# Patient Record
Sex: MALE | Race: WHITE | Employment: FULL TIME | URBAN - METROPOLITAN AREA
[De-identification: names, ages, dates, MRNs, and addresses within clinical notes are randomized per-mention and may not be internally consistent; named-entity substitution may affect disease eponyms.]

---

## 2022-10-10 ENCOUNTER — OFFICE VISIT (OUTPATIENT)
Dept: ORTHOPEDIC SURGERY | Age: 57
End: 2022-10-10
Payer: COMMERCIAL

## 2022-10-10 VITALS — BODY MASS INDEX: 32.33 KG/M2 | WEIGHT: 260 LBS | HEIGHT: 75 IN

## 2022-10-10 DIAGNOSIS — M25.561 ACUTE PAIN OF RIGHT KNEE: Primary | ICD-10-CM

## 2022-10-10 PROCEDURE — 20610 DRAIN/INJ JOINT/BURSA W/O US: CPT | Performed by: ORTHOPAEDIC SURGERY

## 2022-10-10 PROCEDURE — 99203 OFFICE O/P NEW LOW 30 MIN: CPT | Performed by: ORTHOPAEDIC SURGERY

## 2022-10-10 RX ORDER — SILDENAFIL 100 MG/1
100 TABLET, FILM COATED ORAL DAILY PRN
COMMUNITY
Start: 2022-04-28

## 2022-10-10 RX ORDER — ATORVASTATIN CALCIUM 10 MG/1
10 TABLET, FILM COATED ORAL DAILY
COMMUNITY
Start: 2022-04-28

## 2022-10-10 RX ORDER — TRIAMCINOLONE ACETONIDE 40 MG/ML
80 INJECTION, SUSPENSION INTRA-ARTICULAR; INTRAMUSCULAR ONCE
Status: COMPLETED | OUTPATIENT
Start: 2022-10-10 | End: 2022-10-10

## 2022-10-10 RX ORDER — LIDOCAINE HYDROCHLORIDE 10 MG/ML
3 INJECTION, SOLUTION INFILTRATION; PERINEURAL ONCE
Status: COMPLETED | OUTPATIENT
Start: 2022-10-10 | End: 2022-10-10

## 2022-10-10 RX ADMIN — TRIAMCINOLONE ACETONIDE 80 MG: 40 INJECTION, SUSPENSION INTRA-ARTICULAR; INTRAMUSCULAR at 09:22

## 2022-10-10 RX ADMIN — LIDOCAINE HYDROCHLORIDE 3 ML: 10 INJECTION, SOLUTION INFILTRATION; PERINEURAL at 09:21

## 2022-10-10 NOTE — PROGRESS NOTES
Suzy Starkey is seen today for evaluation of his right knee. He is status post ACL reconstruction with patella tendon performed in 1993. He has done well from that but over the last 5 or 6 months has noticed increasing right knee discomfort. Hurts in the medial knee. He has pain getting up from a seated position, after driving, and at night. Pain is 6 or 7 out of 10. He has not had formal treatment for the right knee since his surgery. Left knee status post patella tendon repair in 2009. He has high cholesterol but is otherwise healthy. He runs an audiovisual company. History: Patient's relevant past family, medical, and social history are reviewed as part of today's visit. ROS of pertinent positives and negatives as above; otherwise negative. General Exam:    Vitals: Height 6' 3\" (1.905 m), weight 260 lb (117.9 kg). Constitutional: Patient is adequately groomed with no evidence of malnutrition  Mental Status: The patient is oriented to time, place and person. The patient's mood and affect are appropriate. Gait:  Patient walks with normal gait and station. Lymphatic: The lymphatic examination bilaterally reveals all areas to be without enlargement or induration. Vascular: Examination reveals no swelling or calf tenderness. Peripheral pulses are palpable and 2+. Neurological: The patient has good coordination. There is no weakness or sensory deficit. Skin:    Head/Neck: inspection reveals no rashes, ulcerations or lesions. Trunk:  inspection reveals no rashes, ulcerations or lesions. Right Lower Extremity: inspection reveals no rashes, ulcerations or lesions. Left Lower Extremity: inspection reveals no rashes, ulcerations or lesions. Examination of the bilateral hips reveals normal flexion and extension. There is no restriction in rotation. There is no tenderness to palpation anteriorly posteriorly or laterally. Left knee has full range of motion with no tenderness.   He has a well-healed incision. Right knee has a small effusion. He has pain over the medial joint line. He has mild crepitation. He is stable. Range of motion 0 to 125 degrees with mild discomfort at both terminal extension and flexion. X-rays were obtained today in the office and interpreted by me. AP standing, PA flexed, and merchant views of the bilateral knees as well as a lateral of the right knee. These demonstrate:  Significant medial and patellofemoral narrowing of the right knee with patellofemoral narrowing on the left. ACL hardware is in appropriate position. Assessment: Arthritic change right knee. Plan: Today we will start with an aspiration and injection. Procedure: Under sterile technique, right knee was anesthetized in the suprapatellar pouch. Then aspirate the right knee of approximately 30 cc of's serosanguineous fluid. Right knee was then injected with 80 mg of Kenalog.       Follow-up with me in about 6 weeks

## 2022-11-21 ENCOUNTER — OFFICE VISIT (OUTPATIENT)
Dept: ORTHOPEDIC SURGERY | Age: 57
End: 2022-11-21
Payer: COMMERCIAL

## 2022-11-21 VITALS — HEIGHT: 75 IN | WEIGHT: 260 LBS | BODY MASS INDEX: 32.33 KG/M2

## 2022-11-21 DIAGNOSIS — M25.561 ACUTE PAIN OF RIGHT KNEE: Primary | ICD-10-CM

## 2022-11-21 DIAGNOSIS — M17.11 ARTHRITIS OF RIGHT KNEE: ICD-10-CM

## 2022-11-21 PROCEDURE — 99212 OFFICE O/P EST SF 10 MIN: CPT | Performed by: ORTHOPAEDIC SURGERY

## 2022-11-21 NOTE — PROGRESS NOTES
Agnieszka Moreno returns today for his right knee. 6 weeks ago we did an aspiration and injection he said within a day or 2 he felt dramatically better. Currently he has pain that is about 1 or 2 out of 10 but typically feels like he can do all of his regular activities. General Exam:    Vitals: Height 6' 3\" (1.905 m), weight 260 lb (117.9 kg). Constitutional: Patient is adequately groomed with no evidence of malnutrition  Mental Status: The patient is oriented to time, place and person. The patient's mood and affect are appropriate. Right knee today has a small nondrainable effusion. He has no joint line pain. Is full motion. He walks with a normal gait. Reminded him that we can repeat injections not sooner than every 3 months. If he begins to have pain with activities of daily living we should see him sooner. He certainly can use ice after activities and continue to use intermittent ibuprofen or Aleve. All questions have been answered. Follow-up with me on an as-needed basis.

## 2022-12-20 ENCOUNTER — OFFICE VISIT (OUTPATIENT)
Dept: ORTHOPEDIC SURGERY | Age: 57
End: 2022-12-20

## 2022-12-20 VITALS — WEIGHT: 260 LBS | HEIGHT: 75 IN | BODY MASS INDEX: 32.33 KG/M2

## 2022-12-20 DIAGNOSIS — M17.11 ARTHRITIS OF RIGHT KNEE: Primary | ICD-10-CM

## 2022-12-20 DIAGNOSIS — M25.461 EFFUSION OF RIGHT KNEE JOINT: ICD-10-CM

## 2022-12-20 RX ORDER — METHYLPREDNISOLONE 4 MG/1
TABLET ORAL
Qty: 1 KIT | Refills: 0 | Status: SHIPPED | OUTPATIENT
Start: 2022-12-20

## 2022-12-20 RX ORDER — LIDOCAINE HYDROCHLORIDE 10 MG/ML
3 INJECTION, SOLUTION INFILTRATION; PERINEURAL ONCE
Status: COMPLETED | OUTPATIENT
Start: 2022-12-20 | End: 2022-12-20

## 2022-12-20 RX ADMIN — LIDOCAINE HYDROCHLORIDE 3 ML: 10 INJECTION, SOLUTION INFILTRATION; PERINEURAL at 08:27

## 2023-01-30 ENCOUNTER — OFFICE VISIT (OUTPATIENT)
Dept: ORTHOPEDIC SURGERY | Age: 58
End: 2023-01-30
Payer: COMMERCIAL

## 2023-01-30 VITALS — WEIGHT: 260 LBS | BODY MASS INDEX: 32.33 KG/M2 | HEIGHT: 75 IN

## 2023-01-30 DIAGNOSIS — M25.561 ACUTE PAIN OF RIGHT KNEE: ICD-10-CM

## 2023-01-30 DIAGNOSIS — M25.461 EFFUSION OF RIGHT KNEE JOINT: ICD-10-CM

## 2023-01-30 DIAGNOSIS — M17.11 ARTHRITIS OF RIGHT KNEE: Primary | ICD-10-CM

## 2023-01-30 PROCEDURE — 99999 PR OFFICE/OUTPT VISIT,PROCEDURE ONLY: CPT | Performed by: ORTHOPAEDIC SURGERY

## 2023-01-30 PROCEDURE — 20610 DRAIN/INJ JOINT/BURSA W/O US: CPT | Performed by: ORTHOPAEDIC SURGERY

## 2023-01-30 RX ORDER — METHYLPREDNISOLONE ACETATE 40 MG/ML
80 INJECTION, SUSPENSION INTRA-ARTICULAR; INTRALESIONAL; INTRAMUSCULAR; SOFT TISSUE ONCE
Status: COMPLETED | OUTPATIENT
Start: 2023-01-30 | End: 2023-01-30

## 2023-01-30 RX ORDER — LIDOCAINE HYDROCHLORIDE 10 MG/ML
3 INJECTION, SOLUTION INFILTRATION; PERINEURAL ONCE
Status: COMPLETED | OUTPATIENT
Start: 2023-01-30 | End: 2023-01-30

## 2023-01-30 RX ADMIN — LIDOCAINE HYDROCHLORIDE 3 ML: 10 INJECTION, SOLUTION INFILTRATION; PERINEURAL at 09:33

## 2023-01-30 RX ADMIN — METHYLPREDNISOLONE ACETATE 80 MG: 40 INJECTION, SUSPENSION INTRA-ARTICULAR; INTRALESIONAL; INTRAMUSCULAR; SOFT TISSUE at 09:34

## 2023-01-30 NOTE — PROGRESS NOTES
Jim Darling returns today for his right knee arthritis. I did an aspiration and Medrol Dosepak about 6 weeks ago. His pain has returned and now he is having a lot of trouble with driving and pain can be as bad as 7 out of 10. General Exam:    Vitals: Height 6' 3\" (1.905 m), weight 260 lb (117.9 kg). Constitutional: Patient is adequately groomed with no evidence of malnutrition  Mental Status: The patient is oriented to time, place and person. The patient's mood and affect are appropriate. Right knee has a small to moderate effusion. He has pain anteriorly and medially with crepitation. I again reviewed his x-rays which show medial and patellofemoral arthritis. Assessment: Recurrent pain from right knee arthritis. Plan: This point in recommending he only seek consultation considering arthroplasty. We will refer him over to Dr. Briana Ying. I obtained verbal consent for the right knee aspiration and injection. Procedure: Under sterile technique, right knee was anesthetized in the suprapatellar pouch. Aspirate the right knee of about 20 cc of serous fluid.   Right knee was injected with 80 mg of Depo-Medrol

## 2023-05-30 ENCOUNTER — TELEPHONE (OUTPATIENT)
Dept: ORTHOPEDIC SURGERY | Age: 58
End: 2023-05-30

## 2023-06-01 ENCOUNTER — OFFICE VISIT (OUTPATIENT)
Dept: ORTHOPEDIC SURGERY | Age: 58
End: 2023-06-01

## 2023-06-01 VITALS — WEIGHT: 260 LBS | BODY MASS INDEX: 32.33 KG/M2 | HEIGHT: 75 IN

## 2023-06-01 DIAGNOSIS — M25.461 EFFUSION OF RIGHT KNEE JOINT: ICD-10-CM

## 2023-06-01 DIAGNOSIS — M17.11 ARTHRITIS OF RIGHT KNEE: Primary | ICD-10-CM

## 2023-06-01 RX ORDER — METHYLPREDNISOLONE ACETATE 40 MG/ML
80 INJECTION, SUSPENSION INTRA-ARTICULAR; INTRALESIONAL; INTRAMUSCULAR; SOFT TISSUE ONCE
Status: COMPLETED | OUTPATIENT
Start: 2023-06-01 | End: 2023-06-01

## 2023-06-01 RX ORDER — LIDOCAINE HYDROCHLORIDE 10 MG/ML
3 INJECTION, SOLUTION INFILTRATION; PERINEURAL ONCE
Status: COMPLETED | OUTPATIENT
Start: 2023-06-01 | End: 2023-06-01

## 2023-06-01 RX ADMIN — LIDOCAINE HYDROCHLORIDE 3 ML: 10 INJECTION, SOLUTION INFILTRATION; PERINEURAL at 09:40

## 2023-06-01 RX ADMIN — METHYLPREDNISOLONE ACETATE 80 MG: 40 INJECTION, SUSPENSION INTRA-ARTICULAR; INTRALESIONAL; INTRAMUSCULAR; SOFT TISSUE at 09:40

## 2023-06-01 NOTE — PROGRESS NOTES
Marguerite Reed returns today for his right knee. He had about a month to 6 weeks of relief from his last injection. He is still hoping to push arthroplasty off until the fall. I encouraged him again to see Dr. Yolie Fenton sooner rather than later to be sure that he can get the time that he needs. General Exam:    Vitals: Height 6' 3\" (1.905 m), weight 260 lb (117.9 kg). Constitutional: Patient is adequately groomed with no evidence of malnutrition  Mental Status: The patient is oriented to time, place and person. The patient's mood and affect are appropriate. Right knee has a moderate effusion. He has pain anteriorly and medially with crepitation. I again reviewed his x-rays which show medial and patellofemoral arthritis. Assessment: Recurrent pain from right knee arthritis. Plan: I obtained verbal consent for the right knee aspiration and injection. Procedure: Under sterile technique, right knee was anesthetized in the suprapatellar pouch. Aspirate the right knee of about 35 cc of serous fluid.   Right knee was injected with 80 mg of Depo-Medrol

## 2023-08-14 ENCOUNTER — OFFICE VISIT (OUTPATIENT)
Dept: ORTHOPEDIC SURGERY | Age: 58
End: 2023-08-14
Payer: COMMERCIAL

## 2023-08-14 VITALS — HEIGHT: 75 IN | WEIGHT: 260 LBS | RESPIRATION RATE: 18 BRPM | BODY MASS INDEX: 32.33 KG/M2

## 2023-08-14 DIAGNOSIS — M17.11 ARTHRITIS OF RIGHT KNEE: Primary | ICD-10-CM

## 2023-08-14 PROCEDURE — 99213 OFFICE O/P EST LOW 20 MIN: CPT | Performed by: ORTHOPAEDIC SURGERY

## 2023-08-14 NOTE — PROGRESS NOTES
[address] [facility]  Office Visit    Chief Complaint: Right knee pain    HPI:  Tariq Fleming is a 63 y.o. who is here for evaluation of right knee pain. He has been seeing Dr. Mic Thomason for this issue. He has a history of right knee ACL tear and reconstruction in  with Dr. Charleen Albarran. He has had worsening knee pain for the last 1 year and has been treated with physical therapy exercises and steroid injections with Dr. Mic Thomason.  He currently reports knee pain on a daily basis. His last steroid injection helped for less than 1 month. The patient has difficulty climbing stairs, difficulty getting out of a chair, difficulty with activities of daily living including hygiene and pain at night. Pain level at rest is 7 and with activities 9-10/10. He is otherwise healthy and denies tobacco use, heart or lung issues, history of blood clots, blood thinners. He has help at home. He walks without assistive device. There is no problem list on file for this patient. ROS:  Constitutional: denies fever, chills, weight loss  MSK: denies pain in other joints, muscle aches  Neurological: denies numbness, tingling, weakness    Exam:  Resp. rate 18, height 6' 3\" (1.905 m), weight 260 lb (117.9 kg). Appearance: sitting in exam room chair, appears to be in no acute distress, awake and alert  Resp: unlabored breathing on room air  Skin: warm, dry and intact with out erythema or significant increased temperature  Neuro: grossly intact both lower extremities. Intact sensation to light touch. Motor exam 4+ to 5/5 in all major motor groups. Right knee: Well-healed anterior midline incision. Examination reveals that knee range of motion is 5 to 125 degrees. There is varus deformity, positive crepitus, positive joint line tenderness, positive antalgic gait. Neurologically, plantar flexion and dorsiflexion is intact. 5/5 strength.      Imagin views of the right knee were performed and interpreted

## 2023-08-15 ENCOUNTER — TELEPHONE (OUTPATIENT)
Dept: ORTHOPEDIC SURGERY | Age: 58
End: 2023-08-15

## 2023-08-15 NOTE — TELEPHONE ENCOUNTER
CT orders faxed to [facility] AT [facility]

## 2023-08-16 ENCOUNTER — TELEPHONE (OUTPATIENT)
Dept: ORTHOPEDIC SURGERY | Age: 58
End: 2023-08-16

## 2023-08-16 NOTE — TELEPHONE ENCOUNTER
Medical Facility Question     Facility Name: [facility] Name: [facility]  Contact Number: [phone]  Request or Information: CT SCAN OF KNEE DENIED. THE X-RAYS DON'T SHOW THE FINDINGS ARE PRESEST. PEER TO PEER [phone]

## 2023-08-25 ENCOUNTER — TELEPHONE (OUTPATIENT)
Dept: ORTHOPEDIC SURGERY | Age: 58
End: 2023-08-25

## 2023-08-25 NOTE — TELEPHONE ENCOUNTER
General Question     Subject: Cinthia Grayl - CT DENIED  Patient and /or Facility Request: Cristina Odom  Contact Number:       Tivis Situ [phone], ANYBODY CAN HELP. THE INSURANCE DENIED THE CT FOR THIS PATIENT AND IS SCHEDULED FOR 08/29/2023. Zoom PHONE # [phone], CASE [id number]. CALL THE [employer] FIRST TO DO THE APPEAL AND THEN LET [facility] KNOW WHAT HAPPENED WITH THE APPEAL.

## 2023-08-28 ENCOUNTER — TELEPHONE (OUTPATIENT)
Dept: ORTHOPEDIC SURGERY | Age: 58
End: 2023-08-28

## 2023-08-28 NOTE — TELEPHONE ENCOUNTER
[facility]line[facility] call the office stating that the patient was denied   Nydia Officer left the number and the reference #   [phone]  Ref # [id number]

## 2023-08-29 NOTE — TELEPHONE ENCOUNTER
Spoke to pt on Monday informed him it will have to be at [facility] around Dec 3rd. He understood and I told him I would call him when I get it scheduled.

## 2023-08-30 ENCOUNTER — TELEPHONE (OUTPATIENT)
Dept: ORTHOPEDIC SURGERY | Age: 58
End: 2023-08-30

## 2023-08-30 NOTE — TELEPHONE ENCOUNTER
Pt scheduled for CT / Oscar Garth on 11-29 at 8:00am with 7:30 arrival.   Pt informed. [facility] wont pay for it, so he is a no charge for the procedure.

## 2023-11-29 ENCOUNTER — HOSPITAL ENCOUNTER (OUTPATIENT)
Dept: CT IMAGING | Age: 58
Discharge: HOME | End: 2023-11-29
Attending: ORTHOPAEDIC SURGERY
Payer: COMMERCIAL

## 2023-11-29 DIAGNOSIS — M17.11 ARTHRITIS OF RIGHT KNEE: ICD-10-CM

## 2023-11-29 PROCEDURE — 73700 CT LOWER EXTREMITY W/O DYE: CPT

## 2023-12-01 ENCOUNTER — TELEPHONE (OUTPATIENT)
Dept: ORTHOPEDIC SURGERY | Age: 58
End: 2023-12-01

## 2023-12-01 NOTE — TELEPHONE ENCOUNTER
SUBMITTED OUTPATIENT SX PA RIGHT KNEE VIA [facility]. STATUS PENDING. ORDER ID# [id number].

## 2023-12-05 NOTE — TELEPHONE ENCOUNTER
DENIED  Date: 12/20/23  Type of SX: outpatient  Location: [facility]  CPT: 66240  DX: M17.11  SX area: right knee  REASON: Images do not show moderate to severe degenerative changes of the knee. As a result, surgery is not medically necessary. Peer to peer: [phone]  Reference # [id number] or order id# [id number].

## 2023-12-06 NOTE — TELEPHONE ENCOUNTER
RECONSIDERATION APPROVED    Auth: # [id number]    Date Range: 12/20/23-02/17/24  Type of SX:  OUTPATIENT  Location: W  CPT: 02707   DX Code: M17.11  SX area: RT KNEE  Insurance: [facility]  AUTH LETTER SCANNED.

## 2023-12-12 ENCOUNTER — HOSPITAL ENCOUNTER (OUTPATIENT)
Age: 58
Setting detail: SPECIMEN
Discharge: HOME | End: 2023-12-12
Payer: COMMERCIAL

## 2023-12-12 ENCOUNTER — TELEPHONE (OUTPATIENT)
Dept: ORTHOPEDIC SURGERY | Age: 58
End: 2023-12-12

## 2023-12-12 DIAGNOSIS — M17.11 ARTHRITIS OF RIGHT KNEE: ICD-10-CM

## 2023-12-12 LAB
25(OH)D3 SERPL-MCNC: 15.3 NG/ML
ABO + RH BLD: NORMAL
ALBUMIN SERPL-MCNC: 4.5 G/DL (ref 3.4–5)
ANION GAP SERPL CALCULATED.3IONS-SCNC: 10 MMOL/L (ref 3–16)
APTT BLD: 26.6 SEC (ref 22.7–35.9)
BASOPHILS # BLD: 0.1 K/UL (ref 0–0.2)
BASOPHILS NFR BLD: 1.4 %
BILIRUB UR QL STRIP.AUTO: NEGATIVE
BLD GP AB SCN SERPL QL: NORMAL
BUN SERPL-MCNC: 15 MG/DL (ref 7–20)
CALCIUM SERPL-MCNC: 9.3 MG/DL (ref 8.3–10.6)
CHLORIDE SERPL-SCNC: 105 MMOL/L (ref 99–110)
CLARITY UR: CLEAR
CO2 SERPL-SCNC: 27 MMOL/L (ref 21–32)
COLOR UR: YELLOW
CREAT SERPL-MCNC: 0.9 MG/DL (ref 0.9–1.3)
DEPRECATED RDW RBC AUTO: 13.1 % (ref 12.4–15.4)
EOSINOPHIL # BLD: 0.2 K/UL (ref 0–0.6)
EOSINOPHIL NFR BLD: 3.3 %
EST. AVERAGE GLUCOSE BLD GHB EST-MCNC: 111.2 MG/DL
GFR SERPLBLD CREATININE-BSD FMLA CKD-EPI: >60 ML/MIN/{1.73_M2}
GLUCOSE SERPL-MCNC: 109 MG/DL (ref 70–99)
GLUCOSE UR STRIP.AUTO-MCNC: NEGATIVE MG/DL
HBA1C MFR BLD: 5.5 %
HCT VFR BLD AUTO: 46.8 % (ref 40.5–52.5)
HGB BLD-MCNC: 16.5 G/DL (ref 13.5–17.5)
HGB UR QL STRIP.AUTO: NEGATIVE
INR PPP: 1.01 (ref 0.84–1.16)
KETONES UR STRIP.AUTO-MCNC: NEGATIVE MG/DL
LEUKOCYTE ESTERASE UR QL STRIP.AUTO: NEGATIVE
LYMPHOCYTES # BLD: 1.6 K/UL (ref 1–5.1)
LYMPHOCYTES NFR BLD: 26.2 %
MCH RBC QN AUTO: 32.4 PG (ref 26–34)
MCHC RBC AUTO-ENTMCNC: 35.3 G/DL (ref 31–36)
MCV RBC AUTO: 91.9 FL (ref 80–100)
MONOCYTES # BLD: 0.7 K/UL (ref 0–1.3)
MONOCYTES NFR BLD: 11 %
MRSA DNA SPEC QL NAA+PROBE: NORMAL
NEUTROPHILS # BLD: 3.6 K/UL (ref 1.7–7.7)
NEUTROPHILS NFR BLD: 58.1 %
NITRITE UR QL STRIP.AUTO: NEGATIVE
PH UR STRIP.AUTO: 6.5 [PH] (ref 5–8)
PLATELET # BLD AUTO: 229 K/UL (ref 135–450)
PMV BLD AUTO: 9.6 FL (ref 5–10.5)
POTASSIUM SERPL-SCNC: 4.2 MMOL/L (ref 3.5–5.1)
PREALB SERPL-MCNC: 34.9 MG/DL (ref 20–40)
PROT UR STRIP.AUTO-MCNC: NEGATIVE MG/DL
PROTHROMBIN TIME: 13.3 SEC (ref 11.5–14.8)
RBC # BLD AUTO: 5.09 M/UL (ref 4.2–5.9)
SODIUM SERPL-SCNC: 142 MMOL/L (ref 136–145)
SP GR UR STRIP.AUTO: 1.02 (ref 1–1.03)
UA COMPLETE W REFLEX CULTURE PNL UR: NORMAL
UA DIPSTICK W REFLEX MICRO PNL UR: NORMAL
URN SPEC COLLECT METH UR: NORMAL
UROBILINOGEN UR STRIP-ACNC: 0.2 E.U./DL
WBC # BLD AUTO: 6.2 K/UL (ref 4–11)

## 2023-12-12 PROCEDURE — 82040 ASSAY OF SERUM ALBUMIN: CPT

## 2023-12-12 PROCEDURE — 87641 MR-STAPH DNA AMP PROBE: CPT

## 2023-12-12 PROCEDURE — 85610 PROTHROMBIN TIME: CPT

## 2023-12-12 PROCEDURE — 86900 BLOOD TYPING SEROLOGIC ABO: CPT

## 2023-12-12 PROCEDURE — 82306 VITAMIN D 25 HYDROXY: CPT

## 2023-12-12 PROCEDURE — 84134 ASSAY OF PREALBUMIN: CPT

## 2023-12-12 PROCEDURE — 85025 COMPLETE CBC W/AUTO DIFF WBC: CPT

## 2023-12-12 PROCEDURE — 80048 BASIC METABOLIC PNL TOTAL CA: CPT

## 2023-12-12 PROCEDURE — 86850 RBC ANTIBODY SCREEN: CPT

## 2023-12-12 PROCEDURE — 81003 URINALYSIS AUTO W/O SCOPE: CPT

## 2023-12-12 PROCEDURE — 85730 THROMBOPLASTIN TIME PARTIAL: CPT

## 2023-12-12 PROCEDURE — 36415 COLL VENOUS BLD VENIPUNCTURE: CPT

## 2023-12-12 PROCEDURE — 86901 BLOOD TYPING SEROLOGIC RH(D): CPT

## 2023-12-12 PROCEDURE — 83036 HEMOGLOBIN GLYCOSYLATED A1C: CPT

## 2023-12-12 NOTE — TELEPHONE ENCOUNTER
Vitamin D level is low at 15.3. Instructed patient to take over-the-counter Vitamin D [id number] IUs daily.       I informed the patient

## 2023-12-14 ENCOUNTER — OFFICE VISIT (OUTPATIENT)
Dept: ORTHOPEDIC SURGERY | Age: 58
End: 2023-12-14
Payer: COMMERCIAL

## 2023-12-14 VITALS — WEIGHT: 265 LBS | RESPIRATION RATE: 18 BRPM | BODY MASS INDEX: 32.27 KG/M2 | HEIGHT: 76 IN

## 2023-12-14 DIAGNOSIS — M17.11 ARTHRITIS OF RIGHT KNEE: Primary | ICD-10-CM

## 2023-12-14 PROCEDURE — 99214 OFFICE O/P EST MOD 30 MIN: CPT | Performed by: ORTHOPAEDIC SURGERY

## 2023-12-14 NOTE — PROGRESS NOTES
[address] [facility]  Office Visit    Chief Complaint: Right knee pain    HPI:  Mikhail Tucker is a 63 y.o. who is here in follow-up of right knee pain. He is scheduled for right total knee arthroplasty next week. For review, he has been seeing Dr. Coreen Rashid for this issue. He has a history of right knee ACL tear and reconstruction in 1993 with Dr. Riley Hare. He has had worsening knee pain for the last 1 year and has been treated with physical therapy exercises and steroid injections with Dr. Coreen Rashid.  He currently reports knee pain on a daily basis. His last steroid injection helped for less than 1 month. The patient has difficulty climbing stairs, difficulty getting out of a chair, difficulty with activities of daily living including hygiene and pain at night. Pain level at rest is 7 and with activities 9-10/10. He is otherwise healthy and denies tobacco use, heart or lung issues, history of blood clots, blood thinners. He has help at home. He walks without assistive device. There is no problem list on file for this patient. ROS:  Constitutional: denies fever, chills, weight loss  MSK: denies pain in other joints, muscle aches  Neurological: denies numbness, tingling, weakness    Exam:  Resp. rate 18, height 1.93 m (6' 4\"), weight 120.2 kg (265 lb). Appearance: sitting in exam room chair, appears to be in no acute distress, awake and alert  Resp: unlabored breathing on room air  Skin: warm, dry and intact with out erythema or significant increased temperature  Neuro: grossly intact both lower extremities. Intact sensation to light touch. Motor exam 4+ to 5/5 in all major motor groups. Right knee: Well-healed anterior midline incision. Examination reveals that knee range of motion is 5 to 125 degrees. There is varus deformity, positive crepitus, positive joint line tenderness, positive antalgic gait. Neurologically, plantar flexion and dorsiflexion is intact. 5/5 strength.

## 2023-12-28 DIAGNOSIS — M17.11 PRIMARY OSTEOARTHRITIS OF RIGHT KNEE: ICD-10-CM

## 2023-12-28 RX ORDER — OXYCODONE HYDROCHLORIDE 5 MG/1
5-10 TABLET ORAL EVERY 6 HOURS PRN
Qty: 40 TABLET | Refills: 0 | Status: SHIPPED | OUTPATIENT
Start: 2023-12-28 | End: 2024-01-02

## 2023-12-28 NOTE — TELEPHONE ENCOUNTER
Prescription Refill     Medication Name:  OXYCODONE  Pharmacy: [facility]   Patient Contact Number: [phone]    PATIENT'S SPOUSE CALLED TO SCHEDULE RX REFILL. SHE STATED THAT HE HAD ONE OF OXYCODONE REMAINING.      RX IS TO BE FORWARDED TO [facility] INS[facility] [address] [place] [address] [place] [phone]    PLEASE ADVISE

## 2023-12-28 NOTE — TELEPHONE ENCOUNTER
Igor Greene   [phone]   Called and wanted Dr Ronn Graham to know that the patient BP is running high 190-90   Called PCP to make vel aware retook pressure 160-90

## 2024-01-04 ENCOUNTER — OFFICE VISIT (OUTPATIENT)
Dept: ORTHOPEDIC SURGERY | Age: 59
End: 2024-01-04

## 2024-01-04 DIAGNOSIS — Z96.651 STATUS POST TOTAL RIGHT KNEE REPLACEMENT: Primary | ICD-10-CM

## 2024-01-04 PROCEDURE — 99024 POSTOP FOLLOW-UP VISIT: CPT | Performed by: ORTHOPAEDIC SURGERY

## 2024-01-04 RX ORDER — OXYCODONE HYDROCHLORIDE 5 MG/1
5-10 TABLET ORAL EVERY 6 HOURS PRN
Qty: 40 TABLET | Refills: 0 | Status: SHIPPED | OUTPATIENT
Start: 2024-01-04 | End: 2024-01-09

## 2024-01-04 NOTE — PROGRESS NOTES
[facility]  Office Visit    Chief Complaint: Follow-up s/p right total knee arthroplasty    HPI:  Miguel Ángel Patel is a 58 y.o. who is here in follow-up of right total knee arthroplasty performed on December 20, 2023.  He is recovering well postoperatively and rates pain as 2/10.  He is walking with 1 crutch.  He is taking oxycodone at night and after therapy to help with pain.    Exam:  Healing as expectedAppearance: sitting in exam room chair, appears to be in no acute distress, awake and alert  Resp: unlabored breathing on room air  Skin: warm, dry and intact with out erythema or significant increased temperature  Neuro: grossly intact both lower extremities. Intact sensation to light touch. Motor exam 4+ to 5/5 in all major motor groups.  Right knee: Incision is healing as expected.  Range of motion is 5 to 85 degrees.  Sensation is intact light touch.  There is brisk capillary refill. There is 5/5 muscle strength in all muscle groups.    Imaging:  3 views of the right knee were performed and reviewed today. Significant for total knee arthroplasty prosthesis in place with no signs of osteolysis, loosening, fracture, or dislocation.    Assessment:  S/p right total knee arthroplasty    Plan:  He is recovering well postoperatively.  He will transition outpatient physical therapy.  Oxycodone was refilled and he will wean off this as tolerated.  Follow-up in 4 weeks for repeat assessment and range of motion check.    This dictation was done with Dragon dictation and may contain mechanical errors related to translation.

## 2024-01-08 ENCOUNTER — HOSPITAL ENCOUNTER (OUTPATIENT)
Dept: PHYSICAL THERAPY | Age: 59
Setting detail: THERAPIES SERIES
Discharge: HOME | End: 2024-01-08
Payer: COMMERCIAL

## 2024-01-08 DIAGNOSIS — M62.559 ATROPHY OF QUADRICEPS FEMORIS MUSCLE: ICD-10-CM

## 2024-01-08 DIAGNOSIS — M25.561 CHRONIC PAIN OF RIGHT KNEE: Primary | ICD-10-CM

## 2024-01-08 DIAGNOSIS — G89.29 CHRONIC PAIN OF RIGHT KNEE: Primary | ICD-10-CM

## 2024-01-08 DIAGNOSIS — M25.461 EFFUSION OF BURSA OF RIGHT KNEE: ICD-10-CM

## 2024-01-08 PROCEDURE — 97016 VASOPNEUMATIC DEVICE THERAPY: CPT

## 2024-01-08 PROCEDURE — 97162 PT EVAL MOD COMPLEX 30 MIN: CPT

## 2024-01-08 PROCEDURE — 97530 THERAPEUTIC ACTIVITIES: CPT

## 2024-01-08 PROCEDURE — 97110 THERAPEUTIC EXERCISES: CPT

## 2024-01-08 NOTE — FLOWSHEET NOTE
surgery.   (45436) HOME EXERCISE PROGRAM - Reviewed/Progressed HEP activities related to strengthening, flexibility, endurance, ROM performed to prevent loss of range of motion, maintain or improve muscular strength or increase flexibility, following either an injury or surgery.  (05684) NEUROMUSCULAR RE-EDUCATION - Therapeutic procedure, 1 or more areas, each 15 minutes; neuromuscular reeducation of movement, balance, coordination, kinesthetic sense, posture, and/or proprioception for sitting and/or standing activities  (57054) HOME EXERCISE PROGRAM - Reviewed/Progressed HEP activities related to neuromuscular reeducation of movement, balance, coordination, kinesthetic sense, posture, and/or proprioception for sitting and/or standing activities    (58099) THERAPEUTIC ACTIVITY - use of dynamic activities to improve functional performance. (Ex include squatting, ascending/descending stairs, walking, bending, lifting, catching, throwing, pushing, pulling, jumping.)  Direct, one on one contact, billed in 15-minute increments.  (27334) MANUAL THERAPY -  Manual therapy techniques, 1 or more regions, each 15 minutes (Mobilization/manipulation, manual lymphatic drainage, manual traction) for the purpose of modulating pain, promoting relaxation,  increasing ROM, reducing/eliminating soft tissue swelling/inflammation/restriction, improving soft tissue extensibility and allowing for proper ROM for normal function with self care, mobility, lifting and ambulation  (51455) VASOPNEUMATIC    TREATMENT PLAN   Plan: POC Initiated today- see eval for details    Electronically Signed by Jovon Palma, PT              Date: 01/08/2024     Note: If patient does not return for scheduled/recommended follow up visits, this note will serve as a discharge from care along with the most recent update on progress.

## 2024-01-08 NOTE — PLAN OF CARE
Apprehension    Dawn's Grind Test    Bibiana's    Joint Line Tenderness    Valgus Laxity    Varus Laxity    Lachmans    Drop Back    Nadia's Neg   LEA MALONE    Scour      1/8/24  Girth L R   Mid Patella 40.5 46.5   Suprapatellar 41.0 47.0   5cm above     15cm above       Reflexes/Sensation: N/A   []Dermatomes/Myotomes intact    []Reflexes equal and normal bilaterally   []Other:    Joint mobility:     []Normal    [x]Hypo   []Hyper    Palpation: no TTP at calf today    Functional Mobility/Transfers: ind    Posture: flexed knee    Bandages/Dressings/Incisions: incision healing well; mesh bandage starting to come off; no drainage or redness but diffuse bruising around knee.    Gait: (include devices/WB status) single crutch; flexed knee in stance phase    Functional Test: deferred  Test     SLS EO     SLS EC          SL Squat          TUG Test     30 sec sit to stand                  ASSESSMENT   Assessment:   Miguel Ángel Patel is a 58 y.o. male presenting today to Outpatient PT with signs and symptoms consistent with Right TKA.    Pt. presents with the functional impairments and activity limitations listed below and would benefit from Outpatient PT to address the below impairments as well as improve pain, and restore function.     Functional Impairments:   Decreased LE functional ROM  Decreased core/proximal hip strength and neuromuscular control  Decreased LE functional strength   Reduced balance/proprioceptive control    Functional Activity Limitations (from functional questionnaire and intake):  Reduced ability to tolerate prolonged functional positions  Reduced ability or difficulty with changes of positions or transfers between positions  Reduced ability to maintain good posture and demonstrate good body mechanics with sitting, bending, and lifting  Reduced ability or tolerance with driving and/or computer work  Reduced ability to perform lifting, reaching, carrying tasks  Reduced ability to

## 2024-01-10 ENCOUNTER — TELEPHONE (OUTPATIENT)
Dept: ORTHOPEDIC SURGERY | Age: 59
End: 2024-01-10

## 2024-01-10 ENCOUNTER — HOSPITAL ENCOUNTER (OUTPATIENT)
Dept: PHYSICAL THERAPY | Age: 59
Setting detail: THERAPIES SERIES
Discharge: HOME | End: 2024-01-10
Payer: COMMERCIAL

## 2024-01-10 DIAGNOSIS — Z96.651 STATUS POST TOTAL RIGHT KNEE REPLACEMENT: Primary | ICD-10-CM

## 2024-01-10 PROCEDURE — 97140 MANUAL THERAPY 1/> REGIONS: CPT

## 2024-01-10 PROCEDURE — 97110 THERAPEUTIC EXERCISES: CPT

## 2024-01-10 PROCEDURE — 97112 NEUROMUSCULAR REEDUCATION: CPT

## 2024-01-10 RX ORDER — OXYCODONE HYDROCHLORIDE 5 MG/1
5 TABLET ORAL EVERY 4 HOURS PRN
Qty: 42 TABLET | Refills: 0 | Status: SHIPPED | OUTPATIENT
Start: 2024-01-10 | End: 2024-01-17

## 2024-01-10 NOTE — TELEPHONE ENCOUNTER
General Question     Subject: MEDICATION/REQ A CALL BACK   Patient and /or Facility Request: Miguel Ángel Patel  Contact Number: [phone]    PATIENT WIFE CALLED IN TO SEE IF THE OFFICE CAN CALL THE INSURANCE TO APPROVE THIS OXYCODONE 5 MG MEDICATION. DUE TO [facility] WILL NOT FILL IT  UNTIL ITS APPROVE THROUGH THE INSURANCE.. TO [facility] IN [employer]..    PATIENT LEAVING OUT OF TOWN TONIGHT..    PLEASE ADVISE

## 2024-01-10 NOTE — TELEPHONE ENCOUNTER
Prescription Refill     Medication Name:  OXY  Pharmacy: [facility]  Patient Contact Number:  [phone]      The pt needs his Oxy called in.  The pharmacist at [facility] inside of [facility] won't fill the script until the doctor calls the pharmacist.

## 2024-01-10 NOTE — FLOWSHEET NOTE
(64847)     Iontophoresis (46647)   VASO (30361)     Ultrasound (24736)   Group Therapy (77600)     Estim Attended (81772)   Canalith Repositioning (25392)     Other:   Other:    Total Timed Code Tx Minutes 55'       Total Treatment Minutes 70'        Charge Justification:  (25705) THERAPEUTIC EXERCISE - Provided verbal/tactile cueing for activities related to strengthening, flexibility, endurance, ROM performed to prevent loss of range of motion, maintain or improve muscular strength or increase flexibility, following either an injury or surgery.   (24111) HOME EXERCISE PROGRAM - Reviewed/Progressed HEP activities related to strengthening, flexibility, endurance, ROM performed to prevent loss of range of motion, maintain or improve muscular strength or increase flexibility, following either an injury or surgery.  (51083) NEUROMUSCULAR RE-EDUCATION - Therapeutic procedure, 1 or more areas, each 15 minutes; neuromuscular reeducation of movement, balance, coordination, kinesthetic sense, posture, and/or proprioception for sitting and/or standing activities  (91282) HOME EXERCISE PROGRAM - Reviewed/Progressed HEP activities related to neuromuscular reeducation of movement, balance, coordination, kinesthetic sense, posture, and/or proprioception for sitting and/or standing activities    (78964) THERAPEUTIC ACTIVITY - use of dynamic activities to improve functional performance. (Ex include squatting, ascending/descending stairs, walking, bending, lifting, catching, throwing, pushing, pulling, jumping.)  Direct, one on one contact, billed in 15-minute increments.  (03238) MANUAL THERAPY -  Manual therapy techniques, 1 or more regions, each 15 minutes (Mobilization/manipulation, manual lymphatic drainage, manual traction) for the purpose of modulating pain, promoting relaxation,  increasing ROM, reducing/eliminating soft tissue swelling/inflammation/restriction, improving soft tissue extensibility and allowing for proper

## 2024-01-17 ENCOUNTER — HOSPITAL ENCOUNTER (OUTPATIENT)
Dept: PHYSICAL THERAPY | Age: 59
Setting detail: THERAPIES SERIES
Discharge: HOME | End: 2024-01-17
Payer: COMMERCIAL

## 2024-01-17 PROCEDURE — 97140 MANUAL THERAPY 1/> REGIONS: CPT

## 2024-01-17 PROCEDURE — 97112 NEUROMUSCULAR REEDUCATION: CPT

## 2024-01-17 PROCEDURE — 97110 THERAPEUTIC EXERCISES: CPT

## 2024-01-17 NOTE — FLOWSHEET NOTE
ascending/descending stairs, walking, bending, lifting, catching, throwing, pushing, pulling, jumping.)  Direct, one on one contact, billed in 15-minute increments.  (42546) MANUAL THERAPY -  Manual therapy techniques, 1 or more regions, each 15 minutes (Mobilization/manipulation, manual lymphatic drainage, manual traction) for the purpose of modulating pain, promoting relaxation,  increasing ROM, reducing/eliminating soft tissue swelling/inflammation/restriction, improving soft tissue extensibility and allowing for proper ROM for normal function with self care, mobility, lifting and ambulation  (07884) VASOPNEUMATIC    TREATMENT PLAN   Plan: POC Initiated today- see eval for details    Electronically Signed by Jovon Palma, PT              Date: 01/17/2024     Note: If patient does not return for scheduled/recommended follow up visits, this note will serve as a discharge from care along with the most recent update on progress.

## 2024-01-19 ENCOUNTER — HOSPITAL ENCOUNTER (OUTPATIENT)
Dept: PHYSICAL THERAPY | Age: 59
Setting detail: THERAPIES SERIES
Discharge: HOME | End: 2024-01-19
Payer: COMMERCIAL

## 2024-01-19 PROCEDURE — 97110 THERAPEUTIC EXERCISES: CPT

## 2024-01-19 PROCEDURE — 97140 MANUAL THERAPY 1/> REGIONS: CPT

## 2024-01-19 PROCEDURE — 97112 NEUROMUSCULAR REEDUCATION: CPT

## 2024-01-24 ENCOUNTER — HOSPITAL ENCOUNTER (OUTPATIENT)
Dept: PHYSICAL THERAPY | Age: 59
Setting detail: THERAPIES SERIES
Discharge: HOME | End: 2024-01-24
Payer: COMMERCIAL

## 2024-01-24 PROCEDURE — 97110 THERAPEUTIC EXERCISES: CPT

## 2024-01-24 PROCEDURE — 97112 NEUROMUSCULAR REEDUCATION: CPT

## 2024-01-24 PROCEDURE — 97140 MANUAL THERAPY 1/> REGIONS: CPT

## 2024-01-24 NOTE — FLOWSHEET NOTE
3x10; 1# Prone     Abduction 3x10; 1#    Adducton     SLR+               Glutes       DL Bridge     SL Bridge     Supine Clams     S/L Clams     Side Stepping       Monster walks               CKC       Weight Shift     Calf raises 3x10    Wall sits     Forward Step ups 3x10; 8 inch     Lateral Step Downs ?NPV?     DL Squat     Split Squat     Pistol Squat     CC TKE 2x10x5\"H ; 5pl with SLS     Lunges     RDLs     SB Bridge + HS Curl     Bridge with Sliders     Nordic Hamstring Curl               PRE       Extension 3x10; 1# RANGE: 90-30   Flexion 3x10; 0# RANGE: Avail   Leg Press  NPV  RANGE: 80-10   Cable Column               Balance       Tandem Stance     Tilt board       SLS  1/24: Combined with TKE    Biodex balance               CORE     Front Plank     Side Plank     Dead Bugs               Other       Treadmill       Gait Training          Manual Interventions     STM 3' Incision; Quad             Modalities:      Education/Home Exercise Program:   Patient instructed on HEP on this date with handout provided and all questions answered. Discussions about how to progress sets/reps/resistance as necessary for fatigue and challenge. Patient was instructed to contact PT with any questions or concerns about HEP moving forward. Patient verbally stated she/he understood.   Access Code: [email or web address]  URL: [email or web address]  Date: 01/17/2024  Prepared by: Scooter Palma    Exercises  - Seated Knee Flexion AAROM  - 3 x daily - 7 x weekly - 10 reps - 10 hold  - Prone Quadriceps Stretch with Strap  - 3 x daily - 7 x weekly - 5 sets - 30\" hold  - Sitting Heel Slide with Towel  - 3 x daily - 7 x weekly - 10 reps - 10 hold  - Seated Table Hamstring Stretch  - 3 x daily - 7 x weekly - 3 reps - 30\" hold  - Long Sitting Calf Stretch with Strap  - 3 x daily - 7 x weekly - 5 reps - 30\" hold  - Long Sitting Quad Set  - 3 x daily - 7 x weekly - 10 sets - 10\" hold  - Active Straight Leg Raise with Quad Set  - 1 x daily - 7 x

## 2024-01-26 ENCOUNTER — HOSPITAL ENCOUNTER (OUTPATIENT)
Dept: PHYSICAL THERAPY | Age: 59
Setting detail: THERAPIES SERIES
Discharge: HOME | End: 2024-01-26
Payer: COMMERCIAL

## 2024-01-26 PROCEDURE — 97112 NEUROMUSCULAR REEDUCATION: CPT

## 2024-01-26 PROCEDURE — 97140 MANUAL THERAPY 1/> REGIONS: CPT

## 2024-01-26 PROCEDURE — 97110 THERAPEUTIC EXERCISES: CPT

## 2024-01-26 NOTE — FLOWSHEET NOTE
complexities/Impairments listed.  [] Progression has been slowed due to co-morbidities.  [x] Plan just implemented, too soon (<30days) to assess goals progression   [] Goals require adjustment due to lack of progress  [] Patient is not progressing as expected and requires additional follow up with physician  [] Other:     CHARGE CAPTURE     PT CHARGE GRID   CPT Code (TIMED) # CPT Code (UNTIMED) #     Therex (99075)  1  EVAL:MODERATE (37194 - Typically 30 minutes face-to-face)     Neuromusc. Re-ed (65712) 1  Re-Eval (76989)     Manual (89843) 1  Estim Unattended (67384)     Ther. Act (28687)   Mech. Traction (95594)     Gait (40158)   Dry Needle 1-2 muscle (84286)     Aquatic Therex (39476)   Dry Needle 3+ muscle (20561)     Iontophoresis (13819)   VASO (15321)     Ultrasound (16429)   Group Therapy (97931)     Estim Attended (10471)   Canalith Repositioning (36623)     Other:   Other:    Total Timed Code Tx Minutes 50'       Total Treatment Minutes 52'        Charge Justification:  (12295) THERAPEUTIC EXERCISE - Provided verbal/tactile cueing for activities related to strengthening, flexibility, endurance, ROM performed to prevent loss of range of motion, maintain or improve muscular strength or increase flexibility, following either an injury or surgery.   (23241) HOME EXERCISE PROGRAM - Reviewed/Progressed HEP activities related to strengthening, flexibility, endurance, ROM performed to prevent loss of range of motion, maintain or improve muscular strength or increase flexibility, following either an injury or surgery.  (95941) NEUROMUSCULAR RE-EDUCATION - Therapeutic procedure, 1 or more areas, each 15 minutes; neuromuscular reeducation of movement, balance, coordination, kinesthetic sense, posture, and/or proprioception for sitting and/or standing activities  (38969) HOME EXERCISE PROGRAM - Reviewed/Progressed HEP activities related to neuromuscular reeducation of movement, balance, coordination, kinesthetic

## 2024-01-29 ENCOUNTER — HOSPITAL ENCOUNTER (OUTPATIENT)
Dept: PHYSICAL THERAPY | Age: 59
Setting detail: THERAPIES SERIES
Discharge: HOME | End: 2024-01-29
Payer: COMMERCIAL

## 2024-01-29 PROCEDURE — 97110 THERAPEUTIC EXERCISES: CPT

## 2024-01-29 PROCEDURE — 97112 NEUROMUSCULAR REEDUCATION: CPT

## 2024-01-29 PROCEDURE — 97530 THERAPEUTIC ACTIVITIES: CPT

## 2024-01-29 NOTE — FLOWSHEET NOTE
(68107)   Estim Unattended (87689)     Ther. Act (37391) 1  Mech. Traction (70159)     Gait (91791)   Dry Needle 1-2 muscle (20560)     Aquatic Therex (60680)   Dry Needle 3+ muscle (20561)     Iontophoresis (23687)   VASO (63937)     Ultrasound (94296)   Group Therapy (85550)     Estim Attended (54141)   Canalith Repositioning (31706)     Other:   Other:    Total Timed Code Tx Minutes 53'       Total Treatment Minutes 54'        Charge Justification:  (04271) THERAPEUTIC EXERCISE - Provided verbal/tactile cueing for activities related to strengthening, flexibility, endurance, ROM performed to prevent loss of range of motion, maintain or improve muscular strength or increase flexibility, following either an injury or surgery.   (14894) HOME EXERCISE PROGRAM - Reviewed/Progressed HEP activities related to strengthening, flexibility, endurance, ROM performed to prevent loss of range of motion, maintain or improve muscular strength or increase flexibility, following either an injury or surgery.  (75431) NEUROMUSCULAR RE-EDUCATION - Therapeutic procedure, 1 or more areas, each 15 minutes; neuromuscular reeducation of movement, balance, coordination, kinesthetic sense, posture, and/or proprioception for sitting and/or standing activities  (50185) HOME EXERCISE PROGRAM - Reviewed/Progressed HEP activities related to neuromuscular reeducation of movement, balance, coordination, kinesthetic sense, posture, and/or proprioception for sitting and/or standing activities    (54569) THERAPEUTIC ACTIVITY - use of dynamic activities to improve functional performance. (Ex include squatting, ascending/descending stairs, walking, bending, lifting, catching, throwing, pushing, pulling, jumping.)  Direct, one on one contact, billed in 15-minute increments.  (89847) MANUAL THERAPY -  Manual therapy techniques, 1 or more regions, each 15 minutes (Mobilization/manipulation, manual lymphatic drainage, manual traction) for the purpose of

## 2024-02-01 ENCOUNTER — HOSPITAL ENCOUNTER (OUTPATIENT)
Dept: PHYSICAL THERAPY | Age: 59
Setting detail: THERAPIES SERIES
Discharge: HOME | End: 2024-02-01
Payer: COMMERCIAL

## 2024-02-01 PROCEDURE — 97112 NEUROMUSCULAR REEDUCATION: CPT

## 2024-02-01 PROCEDURE — 97530 THERAPEUTIC ACTIVITIES: CPT

## 2024-02-01 PROCEDURE — 97110 THERAPEUTIC EXERCISES: CPT

## 2024-02-01 NOTE — FLOWSHEET NOTE
[facility]- Outpatient Rehabilitation and Therapy [address], [place] [address] office: [phone] fax: [phone]      Physical Therapy: TREATMENT/PROGRESS NOTE   Patient: Miguel Ángel Patel (59 y.o. male)   Treatment Date: 2024   :  1965 MRN: [id number]   Visit #: 8 of 10  Insurance Allowable Auth Needed   20 VPCY  10 approved -  No vaso [x]Yes    []No    Insurance: Payor: BC / Plan: [facility] - [place] PPO / Product Type: *No Product type* /   Insurance ID: [id number] - ([facility])  Secondary Insurance (if applicable):    Treatment Diagnosis:     ICD-10-CM    1. Chronic pain of right knee  M25.561     G89.29       2. Effusion of bursa of right knee  M25.461       3. Atrophy of quadriceps femoris muscle  M62.559          Medical Diagnosis:    Unilateral primary osteoarthritis, right knee [M17.11]  S/P Right TKA on 23   Referring Physician: Loy Arguelles MD  PCP: Edmar Herbert                             Plan of care signed (Y/N): N    Date of Patient follow up with Physician: 24     Progress Report/POC: No  POC update due: 2024 (OR 10 visits /OR AUTH LIMITS, whichever is less)    OUTCOME MEASURE DATE % Deficit   WOMAC 24 31%            Latex Allergy:  [x]NO      []YES  Preferred Language for Healthcare:   [x]English       []other:        SUBJECTIVE EXAMINATION     Pain:  0-1/10    Patient Report/Comments: 6+ weeks PO. Patient does report soreness on the lateral aspect of the knee and attributes this to driving.       OBJECTIVE EXAMINATION     24 deferred  Flexibility L R Comment   Hamstring     90/90   Gastroc         ITB     Benito's   Quad     Ely's   Hip Flexor     Russel      24  ROM PROM AROM Comment     L R L R     Knee Flexion   113  ERMI 130       Knee Extension   2 hyper  +2 Lacking 2                   Hip Flexion             Hip Extension             Hip IR             Hip ER                24  Strength L R Comment   Quad

## 2024-02-05 ENCOUNTER — OFFICE VISIT (OUTPATIENT)
Dept: ORTHOPEDIC SURGERY | Age: 59
End: 2024-02-05

## 2024-02-05 DIAGNOSIS — Z96.651 STATUS POST TOTAL RIGHT KNEE REPLACEMENT: Primary | ICD-10-CM

## 2024-02-05 PROCEDURE — 99024 POSTOP FOLLOW-UP VISIT: CPT | Performed by: ORTHOPAEDIC SURGERY

## 2024-02-05 NOTE — PROGRESS NOTES
[facility] Orthopa[facility]ics and Spine  Office Visit    Chief Complaint: Follow-up s/p right total knee arthroplasty    HPI:  Miguel Ángel Patel is a 59 y.o. who is here in follow-up of right total knee arthroplasty performed on December 20, 2023.  He is recovering well postoperatively and walks without assistive device he is off of pain medication.  He continues to work with physical therapy.  He rates pain as 1/10.    Exam:  Appearance: sitting in exam room chair, appears to be in no acute distress, awake and alert  Resp: unlabored breathing on room air  Skin: warm, dry and intact with out erythema or significant increased temperature  Neuro: grossly intact both lower extremities. Intact sensation to light touch. Motor exam 4+ to 5/5 in all major motor groups.  Right knee: Incision is healed.  Range of motion is 5 to 110 degrees.  Sensation is intact light touch.  There is brisk capillary refill. There is 5/5 muscle strength in all muscle groups.    Imaging:  None    Assessment:  S/p right total knee arthroplasty    Plan:  He is recovering well postoperatively.  He will continue working with physical therapy.  He has already returned to work and is off narcotics.  Follow-up for repeat assessment and radiographs in 6 weeks.    This dictation was done with [employer] dictation and may contain mechanical errors related to translation.

## 2024-02-07 ENCOUNTER — HOSPITAL ENCOUNTER (OUTPATIENT)
Dept: PHYSICAL THERAPY | Age: 59
Setting detail: THERAPIES SERIES
Discharge: HOME | End: 2024-02-07
Payer: COMMERCIAL

## 2024-02-07 PROCEDURE — 97530 THERAPEUTIC ACTIVITIES: CPT

## 2024-02-07 PROCEDURE — 97112 NEUROMUSCULAR REEDUCATION: CPT

## 2024-02-07 PROCEDURE — 97110 THERAPEUTIC EXERCISES: CPT

## 2024-02-07 NOTE — FLOWSHEET NOTE
Re-Eval (78980)     Manual (66743)   Estim Unattended (40092)     Ther. Act (99020) 1  Mech. Traction (52141)     Gait (54114)   Dry Needle 1-2 muscle (20560)     Aquatic Therex (33454)   Dry Needle 3+ muscle (20561)     Iontophoresis (01088)   VASO (91782)     Ultrasound (40408)   Group Therapy (61925)     Estim Attended (89633)   Canalith Repositioning (97554)     Other:   Other:    Total Timed Code Tx Minutes 55'       Total Treatment Minutes 60'        Charge Justification:  (68795) THERAPEUTIC EXERCISE - Provided verbal/tactile cueing for activities related to strengthening, flexibility, endurance, ROM performed to prevent loss of range of motion, maintain or improve muscular strength or increase flexibility, following either an injury or surgery.   (88926) HOME EXERCISE PROGRAM - Reviewed/Progressed HEP activities related to strengthening, flexibility, endurance, ROM performed to prevent loss of range of motion, maintain or improve muscular strength or increase flexibility, following either an injury or surgery.  (82349) NEUROMUSCULAR RE-EDUCATION - Therapeutic procedure, 1 or more areas, each 15 minutes; neuromuscular reeducation of movement, balance, coordination, kinesthetic sense, posture, and/or proprioception for sitting and/or standing activities  (82226) HOME EXERCISE PROGRAM - Reviewed/Progressed HEP activities related to neuromuscular reeducation of movement, balance, coordination, kinesthetic sense, posture, and/or proprioception for sitting and/or standing activities    (28441) THERAPEUTIC ACTIVITY - use of dynamic activities to improve functional performance. (Ex include squatting, ascending/descending stairs, walking, bending, lifting, catching, throwing, pushing, pulling, jumping.)  Direct, one on one contact, billed in 15-minute increments.  (44152) MANUAL THERAPY -  Manual therapy techniques, 1 or more regions, each 15 minutes (Mobilization/manipulation, manual lymphatic drainage, manual

## 2024-02-09 ENCOUNTER — HOSPITAL ENCOUNTER (OUTPATIENT)
Dept: PHYSICAL THERAPY | Age: 59
Setting detail: THERAPIES SERIES
Discharge: HOME | End: 2024-02-09
Payer: COMMERCIAL

## 2024-02-09 PROCEDURE — 97110 THERAPEUTIC EXERCISES: CPT

## 2024-02-09 PROCEDURE — 97530 THERAPEUTIC ACTIVITIES: CPT

## 2024-02-09 PROCEDURE — 97112 NEUROMUSCULAR REEDUCATION: CPT

## 2024-02-09 NOTE — PLAN OF CARE
Other       Treadmill       Gait Training          Manual Interventions             Modalities:      Education/Home Exercise Program:   Patient instructed on HEP on this date with handout provided and all questions answered. Discussions about how to progress sets/reps/resistance as necessary for fatigue and challenge. Patient was instructed to contact PT with any questions or concerns about HEP moving forward. Patient verbally stated she/he understood.   Access Code: [email or web address]  URL: [email or web address]  Date: 02/07/2024  Prepared by: Scooter Palma    Exercises  - Seated Table Hamstring Stretch  - 1 x daily - 7 x weekly - 5 reps - 30\" hold  - Supine ITB Stretch with Strap  - 1 x daily - 7 x weekly - 5 sets - 30\" hold  - Prone Quadriceps Stretch with Strap  - 1 x daily - 7 x weekly - 5 sets - 30\" hold  - Sitting Heel Slide with Towel  - 1 x daily - 7 x weekly - 10 reps - 10 hold  - Active Straight Leg Raise with Quad Set  - 1 x daily - 3-4 x weekly - 3 sets - 10 reps  - Sidelying Hip Abduction  - 1 x daily - 3-4 x weekly - 3 sets - 10 reps  - Clamshell with Resistance  - 1 x daily - 3-4 x weekly - 3 sets - 10 reps  - Standing Terminal Knee Extension with Resistance  - 1 x daily - 3-4 x weekly - 3 sets - 10 reps  - Standing Heel Raise  - 1 x daily - 3-4 x weekly - 3 sets - 10 reps  - Single Leg Stance  - 1 x daily - 3-4 x weekly - 3 sets - 30\" hold  - Sit to Stand  - 1 x daily - 3-4 x weekly - 2-3 sets - 10 reps  - Step Up  - 1 x daily - 3-4 x weekly - 3 sets - 10 reps    Given handout for knee extension LLLD 2/9/24      ASSESSMENT     Today's Assessment:  See above.      Medical Necessity Documentation:  I certify that this patient meets the below criteria necessary for medical necessity for care and/or justification of therapy services:  The patient has functional impairments and/or activity limitations and would benefit from continued outpatient therapy services to address the deficits outlined in the

## 2024-02-13 ENCOUNTER — TELEPHONE (OUTPATIENT)
Dept: ORTHOPEDIC SURGERY | Age: 59
End: 2024-02-13

## 2024-02-13 NOTE — TELEPHONE ENCOUNTER
Medical Facility Question     Facility Name: [facility]  Contact Name: JOSÉ MIGUEL  Contact Number: [phone]  Request or Information: PT TREATMENTS APPROVE FOR 5 VISITS FROM 2-12-24 THRU 4-11-24. APPROVAL:  [id number]

## 2024-02-14 ENCOUNTER — HOSPITAL ENCOUNTER (OUTPATIENT)
Dept: PHYSICAL THERAPY | Age: 59
Setting detail: THERAPIES SERIES
End: 2024-02-14
Payer: COMMERCIAL

## 2024-02-16 ENCOUNTER — HOSPITAL ENCOUNTER (OUTPATIENT)
Dept: PHYSICAL THERAPY | Age: 59
Setting detail: THERAPIES SERIES
Discharge: HOME | End: 2024-02-16
Payer: COMMERCIAL

## 2024-02-16 PROCEDURE — 97112 NEUROMUSCULAR REEDUCATION: CPT

## 2024-02-16 PROCEDURE — 97530 THERAPEUTIC ACTIVITIES: CPT

## 2024-02-16 PROCEDURE — 97110 THERAPEUTIC EXERCISES: CPT

## 2024-02-16 NOTE — FLOWSHEET NOTE
[facility]- Outpatient Rehabilitation and Therapy [address], [place] [address] office: [phone] fax: [phone]      Physical Therapy: TREATMENT/PROGRESS NOTE   Patient: Miguel Ángel Patel (59 y.o. male)   Treatment Date: 2024   :  1965 MRN: [id number]   Visit #: 11   Insurance Allowable Auth Needed   20 VPCY    1 of 5 approved -    [x]Yes    []No    Insurance: Payor: BCBS / Plan: [employer] - [place] PPO / Product Type: *No Product type* /   Insurance ID: [id number] - ([facility])  Secondary Insurance (if applicable):    Treatment Diagnosis:     ICD-10-CM    1. Chronic pain of right knee  M25.561     G89.29       2. Effusion of bursa of right knee  M25.461       3. Atrophy of quadriceps femoris muscle  M62.559          Medical Diagnosis:    Unilateral primary osteoarthritis, right knee [M17.11]  S/P Right TKA on 23   Referring Physician: Loy Arguelles MD  PCP: Edmar Herbert                             Plan of care signed (Y/N): Y    Date of Patient follow up with Physician: 3/18/24     Progress Report/POC: Yes  POC update due: 3/9/24 (OR 10 visits /OR AUTH LIMITS, whichever is less)    OUTCOME MEASURE DATE % Deficit   WOMAC 24 31%   WOMAC 24 34%       Latex Allergy:  [x]NO      []YES  Preferred Language for Healthcare:   [x]English       []other:        SUBJECTIVE EXAMINATION     Pain:  0/10    Patient Report/Comments: 8 weeks PO. Patient doing well.  IT band pain improving - not noticing as much pain or difficulty getting out of car. Mild PF pain with stairs.       OBJECTIVE EXAMINATION     24  Flexibility L R Comment   Hamstring -25 -22  90/90   Gastroc         ITB WNL Mild Benito's   Quad 108° 100° Ely's   Hip Flexor     Russel      24  ROM PROM AROM Comment     L R L R     Knee Flexion   121  ERMI 135 115      Knee Extension +3 -1                        24  Strength L R Comment   Quad 5 4+      Hamstring 5  4+      Hip  flexion 4+ 4+

## 2024-02-21 ENCOUNTER — HOSPITAL ENCOUNTER (OUTPATIENT)
Dept: PHYSICAL THERAPY | Age: 59
Setting detail: THERAPIES SERIES
Discharge: HOME | End: 2024-02-21
Payer: COMMERCIAL

## 2024-02-21 PROCEDURE — 97530 THERAPEUTIC ACTIVITIES: CPT

## 2024-02-21 PROCEDURE — 97112 NEUROMUSCULAR REEDUCATION: CPT

## 2024-02-21 PROCEDURE — 97110 THERAPEUTIC EXERCISES: CPT

## 2024-02-21 NOTE — FLOWSHEET NOTE
[facility]- Outpatient Rehabilitation and Therapy [address], [place] [address] office: [phone] fax: [phone]      Physical Therapy: TREATMENT/PROGRESS NOTE   Patient: Miguel Ángel Patel (59 y.o. male)   Treatment Date: 2024   :  1965 MRN: [id number]   Visit #: 12   Insurance Allowable Auth Needed   20 VPCY    2 of 5 approved -    [x]Yes    []No    Insurance: Payor: BCBS / Plan: [employer]BS - [place] PPO / Product Type: *No Product type* /   Insurance ID: [id number] - ([facility])  Secondary Insurance (if applicable):    Treatment Diagnosis:     ICD-10-CM    1. Chronic pain of right knee  M25.561     G89.29       2. Effusion of bursa of right knee  M25.461       3. Atrophy of quadriceps femoris muscle  M62.559          Medical Diagnosis:    Unilateral primary osteoarthritis, right knee [M17.11]  S/P Right TKA on 23   Referring Physician: Loy Arguelles MD  PCP: Edmar Herbert                             Plan of care signed (Y/N): Y    Date of Patient follow up with Physician: 3/18/24     Progress Report/POC: Yes  POC update due: 3/9/24 (OR 10 visits /OR AUTH LIMITS, whichever is less)    OUTCOME MEASURE DATE % Deficit   WOMAC 24 31%   WOMAC 24 34%       Latex Allergy:  [x]NO      []YES  Preferred Language for Healthcare:   [x]English       []other:        SUBJECTIVE EXAMINATION     Pain:  0/10    Patient Report/Comments:  9 weeks PO. Patient doing well.  No pain after last visit. Has been practicing step up and down at home.    OBJECTIVE EXAMINATION     24  Flexibility L R Comment   Hamstring -25 -22  90/90   Gastroc         ITB WNL Mild Benito's   Quad 108° 100° Ely's   Hip Flexor     Russel      24  ROM PROM AROM Comment     L R L R     Knee Flexion   121  ERMI 135 115      Knee Extension +3 -1                        24  Strength L R Comment   Quad 5 4+      Hamstring 5  4+      Hip  flexion 4+ 4+      Hip abd 4+ 4-      Hip Ext 4+ 4

## 2024-03-06 ENCOUNTER — HOSPITAL ENCOUNTER (OUTPATIENT)
Dept: PHYSICAL THERAPY | Age: 59
Setting detail: THERAPIES SERIES
Discharge: HOME | End: 2024-03-06
Payer: COMMERCIAL

## 2024-03-06 PROCEDURE — 97530 THERAPEUTIC ACTIVITIES: CPT

## 2024-03-06 PROCEDURE — 97110 THERAPEUTIC EXERCISES: CPT

## 2024-03-18 ENCOUNTER — OFFICE VISIT (OUTPATIENT)
Dept: ORTHOPEDIC SURGERY | Age: 59
End: 2024-03-18

## 2024-03-18 DIAGNOSIS — Z96.651 STATUS POST TOTAL RIGHT KNEE REPLACEMENT: Primary | ICD-10-CM

## 2024-03-18 PROCEDURE — 99024 POSTOP FOLLOW-UP VISIT: CPT | Performed by: ORTHOPAEDIC SURGERY

## 2024-03-18 NOTE — PROGRESS NOTES
[facility] Orthopa[facility]ics [facility] Spine  Office Visit    Chief Complaint: Follow-up s/p right total knee arthroplasty    HPI:  Miguel Ángel Patel is a 59 y.o. who is here in follow-up of right total knee arthroplasty performed on December 20, 2023.  He is recovering well postoperatively and walks without assistive device. He is off of pain medication.  He has completed formal physical therapy.  He rates pain as up to 1/10 occasionally.    Exam:  Appearance: sitting in exam room chair, appears to be in no acute distress, awake and alert  Resp: unlabored breathing on room air  Skin: warm, dry and intact with out erythema or significant increased temperature  Neuro: grossly intact both lower extremities. Intact sensation to light touch. Motor exam 4+ to 5/5 in all major motor groups.  Right knee: Incision is healed.  Range of motion is 0 to 120 degrees.  Sensation is intact light touch.  There is brisk capillary refill. There is 5/5 muscle strength in all muscle groups.    Imaging:  3 views of the right knee were performed and reviewed today. Significant for total knee arthroplasty prosthesis in place with no signs of osteolysis, loosening, fracture, or dislocation.     Assessment:  S/p right total knee arthroplasty    Plan:  He is recovering well postoperatively.  He will continue self-directed physical therapy exercises. We discussed with the patient today the use of antibiotic prophylaxis prior to any dental procedures.  We discussed that the antibiotic prophylaxis would be used to help prevent periprosthetic joint infections.  If they were not offered antibiotics by the physician performing the procedure, they should contact our office that we may prescribe them antibiotics.     Follow-up in December for annual visit or sooner if needed.    This dictation was done with [employer] dictation and may contain mechanical errors related to translation.

## 2025-06-23 NOTE — PLAN OF CARE
related to strengthening, flexibility, endurance, ROM performed to prevent loss of range of motion, maintain or improve muscular strength or increase flexibility, following either an injury or surgery.   (32268) HOME EXERCISE PROGRAM - Reviewed/Progressed HEP activities related to strengthening, flexibility, endurance, ROM performed to prevent loss of range of motion, maintain or improve muscular strength or increase flexibility, following either an injury or surgery.  (43359) NEUROMUSCULAR RE-EDUCATION - Therapeutic procedure, 1 or more areas, each 15 minutes; neuromuscular reeducation of movement, balance, coordination, kinesthetic sense, posture, and/or proprioception for sitting and/or standing activities  (20481) HOME EXERCISE PROGRAM - Reviewed/Progressed HEP activities related to neuromuscular reeducation of movement, balance, coordination, kinesthetic sense, posture, and/or proprioception for sitting and/or standing activities    (56320) THERAPEUTIC ACTIVITY - use of dynamic activities to improve functional performance. (Ex include squatting, ascending/descending stairs, walking, bending, lifting, catching, throwing, pushing, pulling, jumping.)  Direct, one on one contact, billed in 15-minute increments.  (77572) MANUAL THERAPY -  Manual therapy techniques, 1 or more regions, each 15 minutes (Mobilization/manipulation, manual lymphatic drainage, manual traction) for the purpose of modulating pain, promoting relaxation,  increasing ROM, reducing/eliminating soft tissue swelling/inflammation/restriction, improving soft tissue extensibility and allowing for proper ROM for normal function with self care, mobility, lifting and ambulation  (81620) VASOPNEUMATIC    TREATMENT PLAN   Plan: Return in 1 month for final check if needed 3/6/24    Electronically Signed by Jovon Palma, PT, DPT, OCS            Date: 03/06/2024     Note: If patient does not return for scheduled/recommended follow up visits, this note  78